# Patient Record
Sex: MALE | Race: WHITE
[De-identification: names, ages, dates, MRNs, and addresses within clinical notes are randomized per-mention and may not be internally consistent; named-entity substitution may affect disease eponyms.]

---
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
## 2020-01-15 ENCOUNTER — HOSPITAL ENCOUNTER (OUTPATIENT)
Dept: HOSPITAL 25 - ED | Age: 20
Setting detail: OBSERVATION
LOS: 3 days | Discharge: HOME | DRG: 552 | End: 2020-01-18
Attending: INTERNAL MEDICINE | Admitting: INTERNAL MEDICINE
Payer: COMMERCIAL

## 2020-01-15 DIAGNOSIS — F41.9: ICD-10-CM

## 2020-01-15 DIAGNOSIS — F32.9: ICD-10-CM

## 2020-01-15 DIAGNOSIS — Z79.899: ICD-10-CM

## 2020-01-15 DIAGNOSIS — R33.9: ICD-10-CM

## 2020-01-15 DIAGNOSIS — Z88.2: ICD-10-CM

## 2020-01-15 DIAGNOSIS — M51.17: Primary | ICD-10-CM

## 2020-01-15 LAB
ALBUMIN SERPL BCG-MCNC: 4.6 G/DL (ref 3.2–5.2)
ALBUMIN/GLOB SERPL: 2.6 {RATIO} (ref 1–3)
ALP SERPL-CCNC: 85 U/L (ref 34–104)
ALT SERPL W P-5'-P-CCNC: 24 U/L (ref 7–52)
ANION GAP SERPL CALC-SCNC: 8 MMOL/L (ref 2–11)
AST SERPL-CCNC: 22 U/L (ref 13–39)
BASOPHILS # BLD AUTO: 0.1 10^3/UL (ref 0–0.2)
BUN SERPL-MCNC: 16 MG/DL (ref 6–24)
BUN/CREAT SERPL: 14.2 (ref 8–20)
CALCIUM SERPL-MCNC: 9.2 MG/DL (ref 8.6–10.3)
CHLORIDE SERPL-SCNC: 105 MMOL/L (ref 101–111)
EOSINOPHIL # BLD AUTO: 0 10^3/UL (ref 0–0.6)
GLOBULIN SER CALC-MCNC: 1.8 G/DL (ref 2–4)
GLUCOSE SERPL-MCNC: 96 MG/DL (ref 70–100)
HCO3 SERPL-SCNC: 26 MMOL/L (ref 22–32)
HCT VFR BLD AUTO: 43 % (ref 42–52)
HGB BLD-MCNC: 15 G/DL (ref 14–18)
LYMPHOCYTES # BLD AUTO: 1.3 10^3/UL (ref 1–4.8)
MCH RBC QN AUTO: 29 PG (ref 27–31)
MCHC RBC AUTO-ENTMCNC: 35 G/DL (ref 31–36)
MCV RBC AUTO: 83 FL (ref 80–94)
MONOCYTES # BLD AUTO: 0.5 10^3/UL (ref 0–0.8)
NEUTROPHILS # BLD AUTO: 3.7 10^3/UL (ref 1.5–7.7)
NRBC # BLD AUTO: 0 10^3/UL
NRBC BLD QL AUTO: 0
PLATELET # BLD AUTO: 146 10^3/UL (ref 150–450)
POTASSIUM SERPL-SCNC: 3.5 MMOL/L (ref 3.5–5)
PROT SERPL-MCNC: 6.4 G/DL (ref 6.4–8.9)
RBC # BLD AUTO: 5.15 10^6 /UL (ref 4.18–5.48)
SODIUM SERPL-SCNC: 139 MMOL/L (ref 135–145)
WBC # BLD AUTO: 5.6 10^3/UL (ref 3.5–10.8)

## 2020-01-15 PROCEDURE — 72157 MRI CHEST SPINE W/O & W/DYE: CPT

## 2020-01-15 PROCEDURE — 81003 URINALYSIS AUTO W/O SCOPE: CPT

## 2020-01-15 PROCEDURE — 96372 THER/PROPH/DIAG INJ SC/IM: CPT

## 2020-01-15 PROCEDURE — 36415 COLL VENOUS BLD VENIPUNCTURE: CPT

## 2020-01-15 PROCEDURE — 72158 MRI LUMBAR SPINE W/O & W/DYE: CPT

## 2020-01-15 PROCEDURE — G0378 HOSPITAL OBSERVATION PER HR: HCPCS

## 2020-01-15 PROCEDURE — 72131 CT LUMBAR SPINE W/O DYE: CPT

## 2020-01-15 PROCEDURE — 86140 C-REACTIVE PROTEIN: CPT

## 2020-01-15 PROCEDURE — 85027 COMPLETE CBC AUTOMATED: CPT

## 2020-01-15 PROCEDURE — 83605 ASSAY OF LACTIC ACID: CPT

## 2020-01-15 PROCEDURE — A9579 GAD-BASE MR CONTRAST NOS,1ML: HCPCS

## 2020-01-15 PROCEDURE — 99284 EMERGENCY DEPT VISIT MOD MDM: CPT

## 2020-01-15 PROCEDURE — 87040 BLOOD CULTURE FOR BACTERIA: CPT

## 2020-01-15 PROCEDURE — 85025 COMPLETE CBC W/AUTO DIFF WBC: CPT

## 2020-01-15 PROCEDURE — 80048 BASIC METABOLIC PNL TOTAL CA: CPT

## 2020-01-15 PROCEDURE — 72128 CT CHEST SPINE W/O DYE: CPT

## 2020-01-15 PROCEDURE — 80053 COMPREHEN METABOLIC PANEL: CPT

## 2020-01-15 NOTE — ED
Back Pain





- HPI Summary


HPI Summary: 





Patient complains of sudden onset mid low back pain while getting up from the 

couch half an hour ago.  Patient got up from couch and then went to his bedroom 

and bent over in pain became excruciating. States he did move some furniture 

earlier today.  Denies pain at that time.  Denies history of same pain, trauma, 

IV drug use, leg pain, change in urine, change in BM, any other pain, injury or 

symptoms.





- History of Current Complaint


Chief Complaint: EDBackInjuryPain


Stated Complaint: BACK PAIN PER EMS


Time Seen by Provider: 01/15/20 21:37


Hx Obtained From: Patient


Onset/Duration: Sudden Onset, Lasting Minutes


Onset/Duration: Started Minutes Ago


Timing: Constant


Back Pain Location: Is Discrete @


Severity Initially: Severe


Severity Currently: Severe


Pain Intensity: 10


Pain Scale Used: 0-10 Numeric


Character: Sharp, Aching, Throbbing


Aggravating Symptom(s): Movement, Bending, Walking


Alleviating Symptom(s): Nothing


Associated Signs And Symptoms: Positive: Negative





- Allergies/Home Medications


Allergies/Adverse Reactions: 


 Allergies











Allergy/AdvReac Type Severity Reaction Status Date / Time


 


Sulfa (Sulfonamide Allergy  Fever Verified 08/21/18 18:52





Antibiotics)     











Home Medications: 


 Home Medications





Escitalopram * [Lexapro *] 45 mg PO DAILY 01/15/20 [History Confirmed 01/15/20]


buPROPion SR TAB* [Wellbutrin SR TAB*] 200 mg PO DAILY 01/15/20 [History 

Confirmed 01/15/20]











PMH/Surg Hx/FS Hx/Imm Hx


Endocrine/Hematology History: 


   Denies: Hx Diabetes


Cardiovascular History: 


   Denies: Hx Congestive Heart Failure, Hx Pacemaker/ICD


 History: 


   Denies: Hx Dialysis


Sensory History: 


   Denies: Hx Hearing Aid


Opthamlomology History: 


   Denies: Hx Eye Prosthesis


EENT History: 


   Denies: Hx Deafness


Neurological History: 


   Denies: Hx Dementia


Psychiatric History: 


   Denies: Hx Panic Disorder


Infectious Disease History: No


Infectious Disease History: 


   Denies: Traveled Outside the US in Last 30 Days





- Family History


Known Family History: Positive: Unknown





- Social History


Alcohol Use: Rare


Hx Substance Use: No


Substance Use Type: Reports: None


Hx Tobacco Use: No


Smoking Status (MU): Never Smoked Tobacco





Review of Systems


Constitutional: Negative


Eyes: Negative


ENT: Negative


Cardiovascular: Negative


Respiratory: Negative


Gastrointestinal: Negative


Genitourinary: Negative


Musculoskeletal: Other


Skin: Negative


Neurological: Negative


Psychological: Normal


All Other Systems Reviewed And Are Negative: Yes





Physical Exam





- Summary


Physical Exam Summary: 





PMS intact bilateral lower extremities.


Triage Information Reviewed: Yes


Vital Signs On Initial Exam: 


 Initial Vitals











Temp Pulse Resp BP Pulse Ox


 


 100.2 F   91   22   146/91   98 


 


 01/15/20 21:17  01/15/20 21:17  01/15/20 21:17  01/15/20 21:17  01/15/20 21:17











Vital Signs Reviewed: Yes


Appearance: Positive: Well-Appearing


Skin: Positive: Warm


Head/Face: Positive: Normal Head/Face Inspection


Eyes: Positive: Normal


Neck: Positive: Supple


Respiratory/Lung Sounds: Positive: Clear to Auscultation


Cardiovascular: Positive: Normal


Abdomen Description: Positive: Nontender


Musculoskeletal: Positive: Normal


Neurological: Positive: Normal


Psychiatric: Positive: Normal


AVPU Assessment: Alert





- Valencia Coma Scale


Best Eye Response: 4 - Spontaneous


Best Motor Response: 6 - Obeys Commands


Best Verbal Response: 5 - Oriented


Coma Scale Total: 15





Procedures





- Sedation


Patient Received Moderate/Deep Sedation with Procedure: No





Diagnostics





- Vital Signs


 Vital Signs











  Temp Pulse Resp BP Pulse Ox


 


 01/15/20 21:17  100.2 F  91  22  146/91  98














- Laboratory


Result Diagrams: 


 01/16/20 08:09





 01/16/20 08:09


Lab Statement: Any lab studies that have been ordered have been reviewed, and 

results considered in the medical decision making process.





Back Pain Course/Dx





- Course


Course Of Treatment: Patient complains of sudden onset mid low back pain while 

getting up from the couch half an hour ago.  Patient got up from couch and then 

went to his bedroom and bent over in pain became excruciating. States he did 

move some furniture earlier today.  Denies pain at that time.  Denies history 

of same pain, trauma, IV drug use, leg pain, change in urine, change in BM, any 

other pain, injury or symptoms.  Temperature 100.2.  Repeat temperature normal 

without antipyretics.  Vital signs otherwise within normal limits.  Labs 

unremarkable.  Patient unable to urinate here in the ED.  1500 mL removed by 

catheter.  Patient states he has a difficult time urinating at baseline, but 

says its never been like this.  Rectal sensation and tone normal.  Normal exam 

of lower extremities.  Unable to determine whether urinary retention related to 

acute onset back pain.  Discussed patient with Dr. Mohamud recommends 

admission and MRI in the morning.





- Diagnoses


Provider Diagnoses: 


 Urinary retention, Back pain








Discharge ED





- Sign-Out/Discharge


Documenting (check all that apply): Patient Departure





- Discharge Plan


Condition: Stable


Disposition: ADMITTED TO [facility]





- Billing Disposition and Condition


Condition: STABLE


Disposition: Admitted to [facility]

## 2020-01-15 NOTE — XMS REPORT
Continuity of Care Document (CCD)

 Created on:2019



Patient:Bill Johnson

Sex:Male

:2000

External Reference #:[id number]





Demographics







 Address  [address] [place] [address]

 

 Mobile Phone  [phone]

 

 Email Address  [email or web address]

 

 Preferred Language  en

 

 Marital Status  Not  or 

 

 Confucianism Affiliation  Unknown

 

 Race  White

 

 Ethnic Group  Not  or 









Author







 Name  Puma Silva MD (transmitted by agent of provider Helena Owusu)

 

 Address  [address], [address]



   [place] [address]









Care Team Providers







 Name  Role  Phone

 

 [facility]  Care Team Information   [phone]
[phone]









Problems







 Active Problems  Provider  Date

 

 Current tear of medial cartilage AND/OR meniscus  Puma Silva MD  Onset: 10/




 of knee    







Social History







 Type  Date  Description  Comments

 

 Birth Sex    Unknown  

 

 Tobacco Use  Start: Unknown  Never Smoked Cigarettes  

 

 Smoking Status  Reviewed: 18  Never Smoked Cigarettes  

 

 ETOH Use    Drinks 4 Alcoholic Beverages Per  



     Week  

 

 Tobacco Use  Start: Unknown  Patient has never smoked  

 

 Exercise Type/Frequency    Exercises sporadically  







Allergies, Adverse Reactions, Alerts







 Active Allergies  Reaction  Severity  Comments  Date

 

 Sulfa Antibiotics        2018







Medications







 Active Medications  SIG  Qnty  Indications  Ordering Provider  Date

 

 Lexapro  take one tablet      Unknown  



      20mg Tablets  daily        



           







Immunizations







 Description

 

 No Information Available







Vital Signs







 Date  Vital  Result  Comment

 

 2019  9:11am  Height  75 inches  6'3"









 Weight  188.00 lb  

 

 Heart Rate  60 /min  

 

 BP Systolic  118 mmHg  

 

 BP Diastolic  60 mmHg  

 

 Respiratory Rate  18 /min  

 

 Pain Level  0  

 

 BMI (Body Mass Index)  23.5 kg/m2  









 10/22/2019  1:50pm  Height  75 inches  6'3"









 Weight  197.50 lb  

 

 Heart Rate  78 /min  

 

 BP Systolic  124 mmHg  

 

 BP Diastolic  50 mmHg  

 

 Respiratory Rate  16 /min  

 

 Pain Level  2  

 

 BMI (Body Mass Index)  24.7 kg/m2  







Results







 Description

 

 No Information Available







Procedures







 Description

 

 No Information Available







Medical Devices







 Description

 

 No Information Available







Encounters







 Type  Date  Location  Provider  Dx  Diagnosis

 

 Office Visit  10/22/2019  [facility]  Puma Silva MD  S83.242A  Oth 
tear of



   1:45p  at [place]      medial meniscus,



           current injury,



           left knee, init







Assessments







 Date  Code  Description  Provider

 

 2019  S83.242A  Other tear of medial meniscus, current injury,  Puma Silva MD



     left knee, initial encounter  

 

 10/22/2019  S83.242A  Other tear of medial meniscus, current injury,  Puma Silva MD



     left knee, initial encounter  







Plan of Treatment

2019 - Puma Silva, MDS83.242A Other tear of medial meniscus, current 
injury, left knee, initial encounterFollow up:Follow up:   As needed



Functional Status







 Description

 

 No Information Available







Mental Status







 Description

 

 No Information Available







Referrals







 Description

 

 No Information Available

## 2020-01-16 LAB
ANION GAP SERPL CALC-SCNC: 4 MMOL/L (ref 2–11)
BUN SERPL-MCNC: 15 MG/DL (ref 6–24)
BUN/CREAT SERPL: 13.4 (ref 8–20)
CALCIUM SERPL-MCNC: 8.8 MG/DL (ref 8.6–10.3)
CHLORIDE SERPL-SCNC: 107 MMOL/L (ref 101–111)
GLUCOSE SERPL-MCNC: 105 MG/DL (ref 70–100)
HCO3 SERPL-SCNC: 28 MMOL/L (ref 22–32)
HCT VFR BLD AUTO: 41 % (ref 42–52)
HGB BLD-MCNC: 14.7 G/DL (ref 14–18)
MCH RBC QN AUTO: 29 PG (ref 27–31)
MCHC RBC AUTO-ENTMCNC: 36 G/DL (ref 31–36)
MCV RBC AUTO: 82 FL (ref 80–94)
PLATELET # BLD AUTO: 126 10^3/UL (ref 150–450)
POTASSIUM SERPL-SCNC: 4.2 MMOL/L (ref 3.5–5)
RBC # BLD AUTO: 5.03 10^6 /UL (ref 4.18–5.48)
RBC UR QL AUTO: (no result)
SODIUM SERPL-SCNC: 139 MMOL/L (ref 135–145)
WBC # BLD AUTO: 8.2 10^3/UL (ref 3.5–10.8)

## 2020-01-16 RX ADMIN — DEXAMETHASONE SODIUM PHOSPHATE SCH MG: 4 INJECTION, SOLUTION INTRAMUSCULAR; INTRAVENOUS at 21:32

## 2020-01-16 RX ADMIN — SODIUM CHLORIDE SCH MLS/HR: 900 IRRIGANT IRRIGATION at 18:20

## 2020-01-16 RX ADMIN — DEXAMETHASONE SODIUM PHOSPHATE SCH: 4 INJECTION, SOLUTION INTRAMUSCULAR; INTRAVENOUS at 07:37

## 2020-01-16 RX ADMIN — SODIUM CHLORIDE SCH MLS/HR: 900 IRRIGANT IRRIGATION at 21:33

## 2020-01-16 RX ADMIN — DEXAMETHASONE SODIUM PHOSPHATE SCH MG: 4 INJECTION, SOLUTION INTRAMUSCULAR; INTRAVENOUS at 15:23

## 2020-01-16 RX ADMIN — CITALOPRAM HYDROBROMIDE SCH MG: 10 TABLET ORAL at 09:50

## 2020-01-16 RX ADMIN — ACETAMINOPHEN PRN MG: 325 TABLET ORAL at 18:20

## 2020-01-16 NOTE — HP
CC:  Dr. Judie Velazquez *

 

HISTORY AND PHYSICAL:

 

DATE OF ADMISSION:  01/16/20

 

PRIMARY CARE PROVIDER:  Dr. Judie Velazquez at the [facility].

 

CHIEF COMPLAINT:  Back pain.

 

HISTORY OF PRESENT ILLNESS:  This is a 19-year-old male with past medical 
history of depression/anxiety, who now presents to the emergency room because 
of back pain. The patient reports that he got up from the couch and that is 
when he felt that he had a sudden, sharp, strong pain.  Just prior to that he 
was bending over in the refrigerator putting some food away and then he went to 
his couch and realized that he is having sudden sharp pain, pain was 10/10.  
The pain was so severe that he could not walk secondary to the pain.  There was 
no loss of urine or feces at that time.  The patient reports that the day prior 
he did lift an AC unit that he has lifted several times without any issues.  In 
the emergency room, the patient was seen and evaluated.  CAT scan of the lumbar 
spine and T-spine were done.  The patient was found to have urinary retention, 
after which a straight cath was done and was found to have 1500 cc of urine in 
his bladder, which was subsequently drained.  Dr. Carver, the neurosurgery 
physician, was called who recommended admitting the patient to the hospital and 
getting an MRI in the morning and he will consult on the patient in the morning.

 

In the emergency room, the patient was given Valium, morphine, Zofran.

 

PAST MEDICAL HISTORY:  Depression, anxiety.

 

PAST SURGICAL HISTORY:  None.

 

MEDICATIONS: Home Medications:

1.  Wellbutrin 200 mg daily.

2.  Citalopram 30 mg daily.

 

ALLERGIES:  No known drug allergies.

 

FAMILY HISTORY:  Mother:  Had a thyroid issue.  Father:  Hypertension, heart 
issue.

 

SOCIAL HISTORY:  The patient goes to the [facility] [employer].  Does not smoke.

 

REVIEW OF SYSTEMS:  The patient was noted to have fever in the emergency room, 
no chills, no chest pain, no palpitation, no shortness of breath, no changes in 
vision or hearing, no sore throat.  Reports that he has issues with urination 
from time to time. Gets nervous when he has to urinate, but however, no reports 
of recent urinary retention.  No abdominal pain, no nausea, no vomiting.  Does 
not have any burning upon urination.  No hematuria.  Musculoskeletal:  See HPI.
  Otherwise a full review of systems was done, otherwise is negative.

 

                               PHYSICAL EXAMINATION

 

GENERAL:  This is a well-developed, well-nourished, young male, lying in bed, 
in no acute distress.

 

VITAL SIGNS:  T-max of 101.5 in the emergency room, temperature is 99.5, blood 
pressure 174/97, heart rate of 92, respiratory rate of 14, O2 saturation 98% on 
room air.

 

HEENT:  Pupils are equal, round, reactive to light, atraumatic, normocephalic. 
Oral mucosa is moist.  There is no nystagmus.  There is no oropharyngeal 
erythema.

 

LUNGS:  There is no tachypnea, no use of accessory muscles.  Lungs are clear 
without any wheezing, rales, or rhonchi.

 

HEART:  There is no chest wall tenderness, regular rate and rhythm.  No murmurs
, rubs, or gallops.

 

ABDOMEN:  Normoactive bowel sounds.  Abdomen is soft, nontender, nondistended.

 

RECTAL:  Exam was done by the physician in the emergency room who reported to 
me that there is normal tone and sensation is intact.

 

EXTREMITIES:  He is having tenderness, however, is able to lift the legs. 
Sensation in bilateral legs is intact.  There is tenderness upon movement at 
the back region.  Tender paraspinal muscles.  It is very difficult for the 
patient to lift.  We had to roll the patient in order to palpate the back.

 

NEUROLOGIC:  The patient is awake, alert, and oriented x3.  Following all 
commands. Tongue is midline.  Speech is clear and coherent.  He is able to move 
all 4 extremities, however, is having pain at the back region when moving his 
left and right lower extremity.

 

 DIAGNOSTIC STUDIES/LAB DATA:  Sodium of 139, potassium is hemolyzed, chloride 
of 105, bicarbonate of 26, creatinine of 1.13, BUN of 16, glucose of 96.  Total 
bilirubin 0.5, ALT of 24, alkaline phosphatase of 85.  Albumin of 4.6.  CRP of 
less than 1.0.  WBC of 5.5, hemoglobin of 15.0, hematocrit of 42.7, platelets 
of 146. The patient had urinalysis done, which was specific gravity of 1.009, 
negative nitrite, negative ketones, negative blood, pH of 7.0, bacteria 0, 
trace red blood cells.

 

The patient had a CT scan of the T-spine done, which shows no acute CT 
pathology of the thoracic spine.  The patient also had a lumbar spine CAT scan 
done, which shows mild broad disk bulges at the left L4-L5 and L5-S1, but no 
significant spinal canal or neuroforaminal stenosis.

 

IMPRESSION AND PLAN:

1.  Back pain with urinary retention:  Concern for possible cord compression.  
We will get an MRI of the lumbar spine and thoracic spine when the MRI unit is 
available.  I will start the patient on Decadron 4 mg every 6 hours, first dose 
now.  Give one dose of Flexeril 4 mg now.  Tylenol and morphine for pain 
control. Also, the patient will be seen by 

Dr. Carver, Neurosurgery, for his back pain.

2.  History of depression and anxiety, continue home dose Wellbutrin and 
Celexa.  



 

[id number]/CPS #: [id number]

MTDD

## 2020-01-16 NOTE — PN
Subjective


Date of Service: 01/16/20


Interval History: 





Patient seen today, remains in bed.   Still have pain low back if he tries to 

raise his leg.  minimal numbness on his right 5 toe.  Carranza cath in place.  

Awaiting MRI of back and Neurosurgery consult


Past Medical History: Unchanged from Admission





Objective


Active Medications: 








Acetaminophen (Tylenol Tab*)  650 mg PO Q6H PRN


   PRN Reason: PAIN - MILD


Bupropion HCl (Wellbutrin Sr Tab*)  200 mg PO DAILY [facility], [facility]


Citalopram Hydrobromide (Celexa Tab*)  30 mg PO DAILY [facility]


   Last Admin: 01/16/20 09:50 Dose:  30 mg


Dexamethasone Sodium Phosphate (Decadron Iv*)  4 mg IV SLOW PU Q8HR [facility], [facility]


   Last Admin: 01/16/20 07:37 Dose:  Not Given


Sodium Chloride (Ns 0.9% 1000 Ml**)  1,000 mls @ 100 mls/hr IV PER RATE [facility], [facility]1983


Morphine Sulfate (Morphine Inj (Syringe))*)  2 mg IV Q4H PRN


   PRN Reason: PAIN - SEVERE


Ondansetron HCl (Zofran Inj*)  4 mg IV Q6H PRN


   PRN Reason: NAUSEA/VOMITING








 Vital Signs - 8 hr











  01/16/20 01/16/20





  06:35 06:47


 


Temperature 98.3 F 98.1 F


 


Pulse Rate 57 88


 


Respiratory 12 18





Rate  


 


Blood Pressure 120/59 133/69





(mmHg)  


 


O2 Sat by Pulse 97 100





Oximetry  











Oxygen Devices in Use Now: None


Appearance: awake, alert no fever or chills


Eyes: No Scleral Icterus, - - EOMI


Ears/Nose/Mouth/Throat: NL Teeth, Lips, Gums, Mucous Membranes Moist


Neck: NL Appearance and Movements; NL JVP, Trachea Midline


Respiratory: Symmetrical Chest Expansion and Respiratory Effort, Clear to 

Auscultation


Cardiovascular: NL Sounds; No Murmurs; No JVD, No Edema


Abdominal: NL Sounds; No Tenderness; No Distention


Extremities: No Edema


Neurological: Alert and Oriented x 3, - - RLE decrease abduction to pain


Result Diagrams: 


 01/16/20 08:09





 01/16/20 08:09





Assess/Plan/Problems-Billing


Assessment: 





20 y/o male presented with acute low back pain and urinary retention.  admitted 

for MRI and  neurosurgery consult rule out cord  compression





- Patient Problems


(1) Back pain


Current Visit: Yes   Status: Acute   Code(s): M54.9 - DORSALGIA, UNSPECIFIED   

SNOMED Code(s): 318959910


   Comment: - Tylenol,  morphine and decadron IV


- F/u MRI and neurosurgery input   





(2) Urinary retention


Current Visit: Yes   Status: Acute   Code(s): R33.9 - RETENTION OF URINE, 

UNSPECIFIED   SNOMED Code(s): 645509313


   Comment: - Carranza in place, pending neurosurgery and MRI will proceed with 

furthe recommendation and voiding trial   





(3) DVT prophylaxis


Current Visit: Yes   Status: Acute   Code(s): Z29.9 - ENCOUNTER FOR 

PROPHYLACTIC MEASURES, UNSPECIFIED   SNOMED Code(s): 451904382


   Comment: SCD

## 2020-01-17 RX ADMIN — ACETAMINOPHEN PRN MG: 325 TABLET ORAL at 08:00

## 2020-01-17 RX ADMIN — CITALOPRAM HYDROBROMIDE SCH MG: 10 TABLET ORAL at 08:01

## 2020-01-17 RX ADMIN — BUPROPION HYDROCHLORIDE SCH MG: 200 TABLET, FILM COATED, EXTENDED RELEASE ORAL at 08:00

## 2020-01-17 RX ADMIN — DOCUSATE SODIUM SCH MG: 100 CAPSULE, LIQUID FILLED ORAL at 22:27

## 2020-01-17 RX ADMIN — DEXAMETHASONE SCH MG: 4 TABLET ORAL at 20:29

## 2020-01-17 RX ADMIN — GABAPENTIN SCH MG: 100 CAPSULE ORAL at 20:29

## 2020-01-17 RX ADMIN — SODIUM CHLORIDE SCH MLS/HR: 900 IRRIGANT IRRIGATION at 05:50

## 2020-01-17 RX ADMIN — GABAPENTIN SCH MG: 100 CAPSULE ORAL at 09:43

## 2020-01-17 RX ADMIN — ACETAMINOPHEN PRN MG: 325 TABLET ORAL at 01:47

## 2020-01-17 RX ADMIN — DEXAMETHASONE SCH MG: 4 TABLET ORAL at 14:41

## 2020-01-17 RX ADMIN — DEXAMETHASONE SODIUM PHOSPHATE SCH MG: 4 INJECTION, SOLUTION INTRAMUSCULAR; INTRAVENOUS at 05:50

## 2020-01-17 RX ADMIN — GABAPENTIN SCH MG: 100 CAPSULE ORAL at 14:42

## 2020-01-17 RX ADMIN — LIDOCAINE SCH PATCH: 50 PATCH CUTANEOUS at 12:23

## 2020-01-17 NOTE — CONS
CONSULTATION REPORT:

 

DATE OF CONSULT:  01/17/20

 

HISTORY OF PRESENT ILLNESS:  The patient is a very pleasant 19-year-old 
gentleman who is a [employer] student with history of depression and anxiety, who 
presented to the emergency room because of back pain.  The patient reports that 
he walked from his couch to his refrigerator and when he bent forward to leave  
the ice cream into the refrigerator, he felt a sudden, sharp back pain.  The 
patient reported that he did not have any radicular symptoms at that time, but 
he developed back pain and  called the ambulance and he was brought to the 
emergency room.  The patient had difficulty walking at that time.  He had 
difficulty urinating and he was catheterized in the ED because of his urine 
residual of 1500.  He was scheduled for an MRI of his thoracic and lumbar spine 
and was kindly admitted by the hospitalist team.  The patient was placed on 
steroids and felt improvement.  We discussed with Dr. Woodruff, who reported 
that the patient had straight leg test positive and had complaints of tingling 
in his toes but today the patient denies any radicular symptoms.  He reported 
he has difficulty with ambulation because of the pain.  The patient reports 
that he has no weakness, numbness or tingling of his extremities.  He reports 
that he has good perineal sensation and no GI incontinence.  The patient still 
has a catheter.  The patient is a [employer] student.  He is accompanied today at 
the bedside by his father, who flew in from [place], [place].

 

PAST MEDICAL HISTORY:  Depression and anxiety.

 

PAST SURGICAL HISTORY:  None.

 

MEDICATIONS:  The patient was taking Wellbutrin and citalopram.  He reports 
that he has some difficulty with urination since he started this medication.

 

ALLERGIES:  No known drug allergies.

 

FAMILY HISTORY:  Thyroid.  Father had hypertension and heart problems.

 

SOCIAL HISTORY:  The patient is a [employer] student.  Tobacco negative.

 

PHYSICAL EXAM:  The patient is not in acute distress.  He is awake, alert and 
oriented x3.  His pupils are equal and reactive.  Cranial nerves II through XII 
are grossly intact.  Motor 4 to 5/5 in all extremities.  No pronator drift.  
Sensory grossly intact to light touch.  Deep tendon reflexes +1 bilaterally.  
No clonus. No Babinski.  Johnson's negative.  Straight leg test positive on the 
left at 60 degrees.  The patient has free range of motion, he has no  
tenderness to palpation of the cervical, thoracic or lumbar spine.  On rectal 
exam, the patient has good rectal tone and perineal sensation and good 
voluntary constriction.

 

DIAGNOSTIC STUDIES/LAB DATA:  The patient had MRI of the thoracic spine that 
did not reveal any evidence of significant stenosis or neuroforaminal stenosis.
  The patient had an MRI of his lumbar spine that revealed degenerative disease 
with a  disk protrusion at L5-S1.  There is no obliteration of the canal and 
the disk has also a central component.

 

ASSESSMENT:  The patient is a very pleasant 19-year-old gentleman with 
significant back pain.  An MRI finding was consistent with central and left L5-
S1 disk herniation.

 

PLAN:  The patient at this point has significant lower extremity pain.  His 
radicular symptoms if present at the beginning have almost resolved with the 
exception of straight leg raising test.  The patient's father reports that the 
patient had a few prior injuries in his back.  He had a fall and landed on his 
feet almost one year ago and he lifted a heavy window air-condition unit the 
morning prior to his admission.  I discussed with him about findings, discussed 
about different treatment options.  Because of the lack of radicular symptoms 
and his clinical improvement,  I think that conservative treatment would be the 
best approach as a first step, reserving surgical intervention as a last 
resort.  We discussed possible removal of the Carranza and obtaining physical 
therapy to see if he would be able to ambulate.  If the patient could ambulate, 
we will also discuss about the possibility of spinal epidural injections.   
Because of the history of difficulty inserting the Carranza, a suspicion of 
stricture has been raised in the emergency room and for this reason, a urology 
consultation might be appropriate.  We discussed also the possibility of 
diskectomy at that level.  We discussed about risks and benefits, expectations, 
limitations, possible complications of the procedure and offered the patient 
the option of considering a second opinion.  His father reports he has an 
orthopedic surgeon who is a friend of his and we encouraged the patient's 
father to discuss patient's condition and will be happy to assist the patient 
if he would consider  transfer to another center if this will be more 
convenient for the family.  I explained to the father that there will be no 
neurosurgical coverage over the weekend and if he develops any acute problems, 
he may need to be transferred to another facility.  We also recommend obtaining 
flexion extension x-ray of his lumbar spine if tolerated to exclude the remote 
possibility of lumbar instability.

 

Thank you very much for allowing us to participate in the care of this patient. 
Please do not hesitate to contact our office in case if you have any further 
questions or concerns regarding the care of this patient.

 

 [id number]/[facility] #: [id number]

GHADA

## 2020-01-17 NOTE — PN
Subjective


Date of Service: 01/17/20


Interval History: 





Patient seen this morning, Dr. Carver at bedside and father at bedside.  

MRI finding reviewed.  Patient does not recall that he did have subjective 

numbness on his right foot yesterday.  He reports his Right foot numbness 

improved.  His leg raising positive on the left today.  yesterday he had 

difficulty with his right leg.  Carranza catheter remains in place.  Rectal exam 

performed by Dr. Carver was reported to be normal


Past Medical History: Unchanged from Admission





Objective


Active Medications: 








Acetaminophen (Tylenol Tab*)  650 mg PO Q6H PRN


   PRN Reason: PAIN - MILD


   Last Admin: 01/17/20 08:00 Dose:  650 mg


Bupropion HCl (Wellbutrin Sr Tab*)  200 mg PO DAILY [facility]


   Last Admin: 01/17/20 08:00 Dose:  200 mg


Citalopram Hydrobromide (Celexa Tab*)  30 mg PO DAILY [facility]


   Last Admin: 01/17/20 08:01 Dose:  30 mg


Cyclobenzaprine HCl (Flexeril Tab*)  10 mg PO TID PRN


   PRN Reason: SPASMS


Dexamethasone (Decadron Tab*)  4 mg PO TID [facility]


Gabapentin (Neurontin Cap(*))  200 mg PO TID [facility]


   Last Admin: 01/17/20 09:43 Dose:  200 mg


Lidocaine (Lidoderm 5% Patch*)  1 patch TRANSDERM DAILY [facility]


   Last Admin: 01/17/20 12:23 Dose:  1 patch


Morphine Sulfate (Morphine Inj (Syringe))*)  2 mg IV Q4H PRN


   PRN Reason: PAIN - SEVERE


   Last Admin: 01/17/20 08:13 Dose:  2 mg


Ondansetron HCl (Zofran Inj*)  4 mg IV Q6H PRN


   PRN Reason: NAUSEA/VOMITING


Pharmacy Profile Note (Lidocaine Patch Remove*)  1 note PATCH OFF DAILY@2100 [facility]








 Vital Signs - 8 hr











  01/17/20 01/17/20 01/17/20





  07:45 07:51 08:13


 


Temperature  97.6 F 


 


Pulse Rate  64 


 


Respiratory 16 15 20





Rate   


 


Blood Pressure  112/48 





(mmHg)   


 


O2 Sat by Pulse  98 





Oximetry   














  01/17/20 01/17/20 01/17/20





  09:38 09:43 11:45


 


Temperature   97.7 F


 


Pulse Rate   64


 


Respiratory 18 16 20





Rate   


 


Blood Pressure   122/56





(mmHg)   


 


O2 Sat by Pulse   99





Oximetry   














  01/17/20





  12:20


 


Temperature 


 


Pulse Rate 


 


Respiratory 18





Rate 


 


Blood Pressure 





(mmHg) 


 


O2 Sat by Pulse 





Oximetry 











Oxygen Devices in Use Now: None


Appearance: awake, alert. no distress


Eyes: No Scleral Icterus, - - EOMI


Ears/Nose/Mouth/Throat: NL Teeth, Lips, Gums, Mucous Membranes Moist


Neck: NL Appearance and Movements; NL JVP, Trachea Midline


Respiratory: Symmetrical Chest Expansion and Respiratory Effort


Cardiovascular: - - Soft murmur


Abdominal: NL Sounds; No Tenderness; No Distention


Neurological: Alert and Oriented x 3


Result Diagrams: 


 01/16/20 08:09





 01/16/20 08:09


Microbiology and Other Data: 


 Microbiology











 01/15/20 21:48 Aerobic Blood Culture - Preliminary





 Blood Venous    No Growth Day 1





 Anaerobic Blood Culture - Preliminary





    No Growth Day 1


 


 01/15/20 21:48 Aerobic Blood Culture - Preliminary





 Blood Venous    No Growth Day 1





 Anaerobic Blood Culture - Preliminary





    No Growth Day 1














Assess/Plan/Problems-Billing


Assessment: 





20 y/o male presented with acute low back pain and urinary retention.  admitted 

for MRI and  neurosurgery consult rule out cord  compression





- Patient Problems


(1) Back pain


Current Visit: Yes   Status: Acute   Code(s): M54.9 - DORSALGIA, UNSPECIFIED   

SNOMED Code(s): 296578446


   Comment: - Tylenol, Morphine and decadron IV.  I Will change to po Decadron


- MRI reveal L5-S1 herniations.  


- Neurosurgery input appreciated


- Will add Flexeril and lidocaine patch   





(2) Urinary retention


Current Visit: Yes   Status: Acute   Code(s): R33.9 - RETENTION OF URINE, 

UNSPECIFIED   SNOMED Code(s): 545067334


   Comment: - Carranza in place


- will try voiding trial today


- Bladder scan post void and to call me if residual greater than 250 cc   





(3) DVT prophylaxis


Current Visit: Yes   Status: Acute   Code(s): Z29.9 - ENCOUNTER FOR 

PROPHYLACTIC MEASURES, UNSPECIFIED   SNOMED Code(s): 081009720


   Comment: SCD

## 2020-01-18 VITALS — DIASTOLIC BLOOD PRESSURE: 44 MMHG | SYSTOLIC BLOOD PRESSURE: 126 MMHG

## 2020-01-18 RX ADMIN — DEXAMETHASONE SCH MG: 4 TABLET ORAL at 09:28

## 2020-01-18 RX ADMIN — DOCUSATE SODIUM SCH MG: 100 CAPSULE, LIQUID FILLED ORAL at 09:28

## 2020-01-18 RX ADMIN — BUPROPION HYDROCHLORIDE SCH MG: 200 TABLET, FILM COATED, EXTENDED RELEASE ORAL at 09:28

## 2020-01-18 RX ADMIN — CITALOPRAM HYDROBROMIDE SCH MG: 10 TABLET ORAL at 09:29

## 2020-01-18 RX ADMIN — LIDOCAINE SCH PATCH: 50 PATCH CUTANEOUS at 09:34

## 2020-01-18 RX ADMIN — GABAPENTIN SCH MG: 100 CAPSULE ORAL at 09:28

## 2020-01-18 NOTE — DS
CC:  Dr. Judie Velazquez; Dr. Carver.

 

DISCHARGE SUMMARY:

 

DATE OF ADMISSION:  01/16/20

 

DATE OF DISCHARGE:  01/18/20

 

FINAL DISCHARGE DIAGNOSES:

1.  Acute low back pain secondary to L5-S1 radiculopathy and herniation.

2.  Urinary retention.

 

SUBJECTIVE:  This is a 19-year-old male admitted to the [facility] on 01/16/20 after he de
veloped sudden acute low back pain, started after he was leaning forward to put his ice cream in the 
fridge.  Pain was excruciating and had limited his ability to ambulate.  Came into the emergency room
, was found to have urinary retention, was admitted to medical service with neurosurgical consultatio
n. The patient at no point had any difficulty with his rectal tone on examination. Neurosurgery saw t
he patient and evaluated him on 01/17/20.  MRI of the spine was obtained and reviewed, it did reveal 
L5-S1 herniated disk.  Carranza catheter at that time was still in place.  After discussing with the martin fall and Dr. Carver of Neurosurgery, it was recommended to try conservative treatment, discontinu
e the urinary Carranza catheter.  At that time, the patient has been on Decadron intravenously.  His Dec
adron was changed to oral and Carranza cath was discontinued. Physical therapy obtained.  The patient ha
s been ambulating yesterday all day and walked about 6 to 7 times around the nursing station in a row
, had normal voiding, did not have any urinary retention.  I saw and evaluated him this morning.  The
 patient reports maximum 1/10 back pain.  Denies any problem with the urinary symptoms.  He is voidin
g well and pain is extremely well tolerated.  Therefore, at this time I see the patient stable for roz mac with outpatient followup with Dr. Carver or the neurosurgeon of the patient's choice as w
[facility] primary care provider, however, with my recommendation to avoid carrying greater than 10 pounds, 
no strenuous activity such as sports or skiing until he is cleared by PCP or his surgeon.

 

DISCHARGE MEDICATIONS:

1.  Continue Wellbutrin 200 mg daily.

2.  Lexapro 45 mg daily.

3.  Tylenol as needed.

4.  Flexeril 10 t.i.d. as needed for back pain and muscle spasm

5.  Neurontin 200 mg t.i.d. as needed only for nerve pain.

6.  Lidocaine patch, apply to low back area as tolerated 12 hour on and 12 hours off

7.  Decadron 1 mg tab to take 2 mg t.i.d. for 2 days, 1 mg t.i.d. for 2 days, 1 mg b.i.d. for 2 days,
 1 mg daily for 2 days, then stop.

 

DISCHARGE DISPOSITION:  Home.

 

DISCHARGE CONDITION:  Stable.

 

 [id number]/[facility] #: [id number]